# Patient Record
Sex: MALE | Race: WHITE | NOT HISPANIC OR LATINO | Employment: FULL TIME | URBAN - NONMETROPOLITAN AREA
[De-identification: names, ages, dates, MRNs, and addresses within clinical notes are randomized per-mention and may not be internally consistent; named-entity substitution may affect disease eponyms.]

---

## 2023-04-27 DIAGNOSIS — M25.561 RIGHT KNEE PAIN, UNSPECIFIED CHRONICITY: Primary | ICD-10-CM

## 2025-05-20 ENCOUNTER — TELEPHONE (OUTPATIENT)
Dept: CARDIOLOGY | Facility: CLINIC | Age: 60
End: 2025-05-20

## 2025-05-20 NOTE — TELEPHONE ENCOUNTER
Caller: SEBASTIAN OTOOLE    Relationship to patient: Significant Other    Best call back number: [phone]    Chief complaint:     Type of visit: NEW PATIENT    Requested date: ASAP     If rescheduling, when is the original appointment: 5-20-25     Additional notes:PATIENT HAD EMERGENCY AND NEEDS TO RESCHEDULE. [facility] CAN NOT PULL SCHEDULE FOR [facility], PLEASE REACH OUT.

## 2025-05-21 ENCOUNTER — TELEPHONE (OUTPATIENT)
Dept: CARDIOLOGY | Facility: CLINIC | Age: 60
End: 2025-05-21